# Patient Record
Sex: FEMALE | Race: WHITE | HISPANIC OR LATINO | Employment: UNEMPLOYED | URBAN - METROPOLITAN AREA
[De-identification: names, ages, dates, MRNs, and addresses within clinical notes are randomized per-mention and may not be internally consistent; named-entity substitution may affect disease eponyms.]

---

## 2021-06-24 ENCOUNTER — OFFICE VISIT (OUTPATIENT)
Dept: INTERNAL MEDICINE CLINIC | Facility: CLINIC | Age: 31
End: 2021-06-24

## 2021-06-24 VITALS
OXYGEN SATURATION: 100 % | DIASTOLIC BLOOD PRESSURE: 78 MMHG | WEIGHT: 152 LBS | TEMPERATURE: 97.9 F | HEART RATE: 91 BPM | SYSTOLIC BLOOD PRESSURE: 111 MMHG | HEIGHT: 65 IN | BODY MASS INDEX: 25.33 KG/M2

## 2021-06-24 DIAGNOSIS — Z86.39 HISTORY OF THYROID NODULE: ICD-10-CM

## 2021-06-24 DIAGNOSIS — R42 EPISODIC LIGHTHEADEDNESS: ICD-10-CM

## 2021-06-24 DIAGNOSIS — Z87.442 PERSONAL HISTORY OF KIDNEY STONES: ICD-10-CM

## 2021-06-24 DIAGNOSIS — Z13.220 SCREENING, LIPID: ICD-10-CM

## 2021-06-24 DIAGNOSIS — Z86.39 HISTORY OF HYPOTHYROIDISM: Primary | ICD-10-CM

## 2021-06-24 DIAGNOSIS — Z23 COVID-19 VACCINE SERIES STARTED: ICD-10-CM

## 2021-06-24 PROCEDURE — 99203 OFFICE O/P NEW LOW 30 MIN: CPT | Performed by: PHYSICIAN ASSISTANT

## 2021-06-24 NOTE — PROGRESS NOTES
Assessment/Plan: We are starting your care today  You report history of hypothyroidism as well as being told you had cysts and or nodules  Labs have been ordered and please schedule the thyroid ultrasound  You also report history of 1 episode of kidney stones in the past never had before no symptoms currently no additional evaluation needed at this time  You are aware you will need to schedule with gyn for routine women's health  You report you are on a list for a dentist with appointments possibly in August or September  Will get you scheduled for a follow-up once we have your lab results  Please sign medical records release to have previous medical records sent here for review  No problem-specific Assessment & Plan notes found for this encounter  Diagnoses and all orders for this visit:    History of hypothyroidism  -     T4, free  -     TSH, 3rd generation    History of thyroid nodule  -     US thyroid; Future    Personal history of kidney stones    Episodic lightheadedness  -     CBC and differential    Screening, lipid  -     Comprehensive metabolic panel  -     Lipid Panel with Direct LDL reflex    COVID-19 vaccine series started          Subjective:      Patient ID: Abigail Orozco is a 27 y o  female  Patient was scheduled for new patient to establish care  Patient does admit that she arrived to her appointment quite late as she is from [place] and got lost coming here  Patient does not have any insurance currently but has established with [facility]  Patient provides history of hypothyroidism and positive family history of same  Mother and sister, states sister is on medication  States was told in past abnormalities with cyst and nodules   Was on meds for 3 months for hypothyroidism then was told not needed    Thinks was about 1 year ago had evaluation      When asked patient does not have many symptoms of hypothyroidism    No constipation no weight gain not feeling more tired than usual does not feel cold or hot  Patient states she was concerned because she has noticed that her nails have been a bit more thin  Patient states she has had a few episodes of feeling lightheaded and thought might be her thyroid  Did ask questions patient's menstruation appears to be regular every month 4-5 days and she does not report extremely heavy bleeding or blood clots  Patient states that she received her 1st COVID vaccine in [place]  Patient states she also had her last Pap smear with her gyn in [place]  Patient also reports had one episode of kidney stones on the left side  States was treated for infection at same time  Never had previously and has not had any issues since then  Does not require any follow-up imaging at this time as patient had 1 episode and currently asymptomatic  Patient however will need to sign a records release to get her previous medical records sent here  Will continue her routine healthcare at follow-up visit as noted patient was late for this appointment due to location  The following portions of the patient's history were reviewed and updated as appropriate: allergies, current medications, past family history, past medical history, past social history, past surgical history and problem list     Review of Systems   Constitutional: Negative for chills, fever and unexpected weight change  Eyes: Negative  Respiratory: Negative for cough, chest tightness and shortness of breath  Gastrointestinal: Negative for abdominal pain, constipation, diarrhea, rectal pain and vomiting  Endocrine: Negative for cold intolerance, heat intolerance, polydipsia, polyphagia and polyuria  Genitourinary: Negative for menstrual problem (4-5 days states not really heavy)  Skin: Negative  Neurological: Positive for light-headedness  Negative for dizziness and headaches  Objective:      /78 (BP Location: Left arm, Patient Position: Sitting, Cuff Size: Standard)   Pulse 91   Temp 97 9 °F (36 6 °C) (Temporal)   Ht 5' 5" (1 651 m)   Wt 68 9 kg (152 lb)   SpO2 100%   BMI 25 29 kg/m²          Physical Exam  Vitals and nursing note reviewed  Constitutional:       General: She is not in acute distress  Appearance: She is not ill-appearing  HENT:      Head: Normocephalic  Mouth/Throat:      Mouth: Mucous membranes are moist       Pharynx: Oropharynx is clear  Comments: Multiple fillings  Eyes:      Extraocular Movements: Extraocular movements intact  Conjunctiva/sclera: Conjunctivae normal    Neck:      Comments: R thyroid larger than left, nontender  Cardiovascular:      Rate and Rhythm: Normal rate and regular rhythm  Heart sounds: Normal heart sounds  Pulmonary:      Effort: Pulmonary effort is normal       Breath sounds: Normal breath sounds  Abdominal:      General: Bowel sounds are normal       Tenderness: There is no abdominal tenderness  Musculoskeletal:      Cervical back: No tenderness  Skin:     General: Skin is warm and dry  Capillary Refill: Capillary refill takes less than 2 seconds  Neurological:      General: No focal deficit present  Mental Status: She is alert  Psychiatric:         Mood and Affect: Mood normal          Thought Content:  Thought content normal          Judgment: Judgment normal          PHQ-9 Depression Screening    PHQ-9:   Frequency of the following problems over the past two weeks:      Little interest or pleasure in doing things: 0 - not at all  Feeling down, depressed, or hopeless: 0 - not at all  PHQ-2 Score: 0

## 2021-06-28 ENCOUNTER — HOSPITAL ENCOUNTER (OUTPATIENT)
Dept: RADIOLOGY | Facility: HOSPITAL | Age: 31
Discharge: HOME | End: 2021-06-28
Payer: COMMERCIAL

## 2021-06-28 ENCOUNTER — APPOINTMENT (OUTPATIENT)
Dept: LAB | Facility: HOSPITAL | Age: 31
End: 2021-06-28
Payer: COMMERCIAL

## 2021-06-28 DIAGNOSIS — Z86.39 HISTORY OF THYROID NODULE: ICD-10-CM

## 2021-06-28 LAB
ALBUMIN SERPL BCP-MCNC: 3.6 G/DL (ref 3.5–5)
ALP SERPL-CCNC: 64 U/L (ref 46–116)
ALT SERPL W P-5'-P-CCNC: 16 U/L (ref 12–78)
ANION GAP SERPL CALCULATED.3IONS-SCNC: 5 MMOL/L (ref 4–13)
AST SERPL W P-5'-P-CCNC: 10 U/L (ref 5–45)
BASOPHILS # BLD AUTO: 0.07 THOUSANDS/ΜL (ref 0–0.1)
BASOPHILS NFR BLD AUTO: 1 % (ref 0–1)
BILIRUB SERPL-MCNC: 0.56 MG/DL (ref 0.2–1)
BUN SERPL-MCNC: 13 MG/DL (ref 5–25)
CALCIUM SERPL-MCNC: 9 MG/DL (ref 8.3–10.1)
CHLORIDE SERPL-SCNC: 106 MMOL/L (ref 100–108)
CHOLEST SERPL-MCNC: 171 MG/DL (ref 50–200)
CO2 SERPL-SCNC: 25 MMOL/L (ref 21–32)
CREAT SERPL-MCNC: 0.78 MG/DL (ref 0.6–1.3)
EOSINOPHIL # BLD AUTO: 0.17 THOUSAND/ΜL (ref 0–0.61)
EOSINOPHIL NFR BLD AUTO: 2 % (ref 0–6)
ERYTHROCYTE [DISTWIDTH] IN BLOOD BY AUTOMATED COUNT: 12.8 % (ref 11.6–15.1)
GFR SERPL CREATININE-BSD FRML MDRD: 101 ML/MIN/1.73SQ M
GLUCOSE P FAST SERPL-MCNC: 87 MG/DL (ref 65–99)
HCT VFR BLD AUTO: 39.5 % (ref 34.8–46.1)
HDLC SERPL-MCNC: 61 MG/DL
HGB BLD-MCNC: 12.9 G/DL (ref 11.5–15.4)
IMM GRANULOCYTES # BLD AUTO: 0.02 THOUSAND/UL (ref 0–0.2)
IMM GRANULOCYTES NFR BLD AUTO: 0 % (ref 0–2)
LDLC SERPL CALC-MCNC: 103 MG/DL (ref 0–100)
LYMPHOCYTES # BLD AUTO: 2.03 THOUSANDS/ΜL (ref 0.6–4.47)
LYMPHOCYTES NFR BLD AUTO: 27 % (ref 14–44)
MCH RBC QN AUTO: 30.5 PG (ref 26.8–34.3)
MCHC RBC AUTO-ENTMCNC: 32.7 G/DL (ref 31.4–37.4)
MCV RBC AUTO: 93 FL (ref 82–98)
MONOCYTES # BLD AUTO: 0.49 THOUSAND/ΜL (ref 0.17–1.22)
MONOCYTES NFR BLD AUTO: 7 % (ref 4–12)
NEUTROPHILS # BLD AUTO: 4.72 THOUSANDS/ΜL (ref 1.85–7.62)
NEUTS SEG NFR BLD AUTO: 63 % (ref 43–75)
NRBC BLD AUTO-RTO: 0 /100 WBCS
PLATELET # BLD AUTO: 171 THOUSANDS/UL (ref 149–390)
PMV BLD AUTO: 12.2 FL (ref 8.9–12.7)
POTASSIUM SERPL-SCNC: 4.1 MMOL/L (ref 3.5–5.3)
PROT SERPL-MCNC: 7.2 G/DL (ref 6.4–8.2)
RBC # BLD AUTO: 4.23 MILLION/UL (ref 3.81–5.12)
SODIUM SERPL-SCNC: 136 MMOL/L (ref 136–145)
T4 FREE SERPL-MCNC: 0.91 NG/DL (ref 0.76–1.46)
TRIGL SERPL-MCNC: 36 MG/DL
TSH SERPL DL<=0.05 MIU/L-ACNC: 1.69 UIU/ML (ref 0.36–3.74)
WBC # BLD AUTO: 7.5 THOUSAND/UL (ref 4.31–10.16)

## 2021-06-28 PROCEDURE — 84443 ASSAY THYROID STIM HORMONE: CPT | Performed by: PHYSICIAN ASSISTANT

## 2021-06-28 PROCEDURE — 36415 COLL VENOUS BLD VENIPUNCTURE: CPT | Performed by: PHYSICIAN ASSISTANT

## 2021-06-28 PROCEDURE — 84439 ASSAY OF FREE THYROXINE: CPT | Performed by: PHYSICIAN ASSISTANT

## 2021-06-28 PROCEDURE — 80053 COMPREHEN METABOLIC PANEL: CPT | Performed by: PHYSICIAN ASSISTANT

## 2021-06-28 PROCEDURE — 76536 US EXAM OF HEAD AND NECK: CPT

## 2021-06-28 PROCEDURE — 85025 COMPLETE CBC W/AUTO DIFF WBC: CPT | Performed by: PHYSICIAN ASSISTANT

## 2021-06-28 PROCEDURE — 80061 LIPID PANEL: CPT | Performed by: PHYSICIAN ASSISTANT

## 2021-07-14 ENCOUNTER — OFFICE VISIT (OUTPATIENT)
Dept: INTERNAL MEDICINE CLINIC | Facility: CLINIC | Age: 31
End: 2021-07-14

## 2021-07-14 ENCOUNTER — TELEPHONE (OUTPATIENT)
Dept: INTERNAL MEDICINE CLINIC | Facility: CLINIC | Age: 31
End: 2021-07-14

## 2021-07-14 VITALS
HEIGHT: 65 IN | HEART RATE: 74 BPM | SYSTOLIC BLOOD PRESSURE: 106 MMHG | DIASTOLIC BLOOD PRESSURE: 71 MMHG | BODY MASS INDEX: 25.74 KG/M2 | OXYGEN SATURATION: 100 % | TEMPERATURE: 98 F | WEIGHT: 154.5 LBS

## 2021-07-14 DIAGNOSIS — K21.9 GASTROESOPHAGEAL REFLUX DISEASE, UNSPECIFIED WHETHER ESOPHAGITIS PRESENT: Primary | ICD-10-CM

## 2021-07-14 DIAGNOSIS — Z86.19 HISTORY OF HELICOBACTER PYLORI INFECTION: ICD-10-CM

## 2021-07-14 PROCEDURE — 99213 OFFICE O/P EST LOW 20 MIN: CPT | Performed by: PHYSICIAN ASSISTANT

## 2021-07-14 NOTE — PROGRESS NOTES
Assessment/Plan: On your visit today we discussed that your stomach pain is not new in fact you now provide history that you have had recurrent acid reflux  You report you have had 3 EGDs most recently September of 2020 in [place]  You also report that you were told positive for H pylori  You are allergic to penicillin but you state you were not able to tolerate the alternative treatments for H pylori  We did review however that you will continue to get symptoms if the bacteria is not treated  Because you were told you had ulcers repetitive EGDs and H pylori referral to GI has been provided today to discuss your evaluation and additional treatment options  In the meantime you may continue to take the Nexium that you purchased over-the-counter  No problem-specific Assessment & Plan notes found for this encounter  Diagnoses and all orders for this visit:    Gastroesophageal reflux disease, unspecified whether esophagitis present  -     Ambulatory referral to Gastroenterology; Future    History of Helicobacter pylori infection  -     Ambulatory referral to Gastroenterology; Future          Subjective:      Patient ID: Jeferson Perez is a 27 y o  female  Patient presents today for same-day medical home with complaint of abdominal pain that started about 2 weeks ago  Has had the same in past states has had EGD X 3 last was Sept 2020 in [place]   patient reports that she was told she had an ulcer and H pylori bacteria  Patient states she was not treated with the antibiotic stating it is due to her penicillin allergy  I advised her that there are alternative treatments but she states the other medications made her stomach really bad so she did not take them  Reviewed with patient that untreated H pylori will means she will continue to have repetitive symptoms  Pain is epigastric and gets worse after eating     Has Nexium and taking in the morning and seems to help a bit  Some nausea  No vomting      Patient does not always have symptoms of acid reflux up into her esophagus that will depend on what she is eating  But it has affected her voice at times  This is also not new  Will have patient meet with Gastroenterology for further discussion as based on her personal history she has had multiple EGDs with ulcers untreated H pylori but resistant to alternative antibiotics other than penicillins which she is allergic to  The following portions of the patient's history were reviewed and updated as appropriate: allergies, current medications, past family history, past medical history, past social history, past surgical history and problem list     Review of Systems   Constitutional: Negative  Negative for chills and fever  Respiratory: Negative  Cardiovascular: Negative  Gastrointestinal: Positive for abdominal distention, abdominal pain and nausea  Negative for vomiting  Neurological: Negative  Psychiatric/Behavioral: Negative  Objective:      /71 (BP Location: Left arm, Patient Position: Sitting, Cuff Size: Standard)   Pulse 74   Temp 98 °F (36 7 °C) (Temporal)   Ht 5' 5" (1 651 m)   Wt 70 1 kg (154 lb 8 oz)   SpO2 100%   BMI 25 71 kg/m²          Physical Exam  Vitals and nursing note reviewed  Cardiovascular:      Rate and Rhythm: Normal rate and regular rhythm  Heart sounds: Normal heart sounds  Pulmonary:      Effort: Pulmonary effort is normal       Breath sounds: Normal breath sounds  Abdominal:      Tenderness: There is abdominal tenderness in the epigastric area and left upper quadrant  There is no guarding or rebound  Negative signs include Page's sign  Hernia: No hernia is present  Neurological:      Mental Status: Mental status is at baseline

## 2021-07-14 NOTE — PATIENT INSTRUCTIONS
On your visit today we discussed that your stomach pain is not new in fact you now provide history that you have had recurrent acid reflux  You report you have had 3 EGDs most recently September of 2020 in [place]  You also report that you were told positive for H pylori  You are allergic to penicillin but you state you were not able to tolerate the alternative treatments for H pylori  We did review however that you will continue to get symptoms if the bacteria is not treated  Because you were told you had ulcers repetitive EGDs and H pylori referral to GI has been provided today to discuss your evaluation and additional treatment options  In the meantime you may continue to take the Nexium that you purchased over-the-counter  Dieta para úlceras estomacales y gastritis   LO QUE NECESITA SABER:   ¿Qué es alexander dieta para úlceras estomacales y gastritis? Alexander dieta para úlceras y gastritis es un plan alimenticio que limita los alimentos que irritan de la rosa BJURHOLM  Ciertos alimentos [employer] síntomas shasta dolor de Rosita DILLON, acidez estomacal o indigestión  ¿Qué alimentos bereket limitar o evitar? Es posible que usted necesite evitar los alimentos ácidos, picantes o altos en grasa  No todos los alimentos afectan a las personas de la [address]  [employer]  Usted necesitará aprender cuáles alimentos empeoran marianne síntomas y tendrá que limitar esos alimentos  Los siguientes son algunos alimentos que podrían empeorar alexander úlcera o los síntomas de la gastritis:  · Bebidas:     ? Heidy Mulligan y [place]    ? Chocolate caliente y refrescos de cola    ? Cualquier bebida con cafeína    ? Café regular y descafeinado    ? Té de hierbabuena y menta kristal    ? Té kristal o mireya, regular o descafeinado    ? Berenda Rand y toronja    ? Bebidas alcohólicas    · Especias y condimentos:     ? Karyle Setter y Jose Gene    ? Polvo de chile    ?  Semilla de mostaza y Radha    · Otros alimentos:     ? Alimentos lácteos hechos de Edgar Taylor    ? Chocolate    ? Quesos picantes o de sabor dolores, shasta de jalapeño o jessica chuyita    ? Carne con alto contenido de grasa y [place] condimentada, shasta el [place], [place], [place], [facility] york y embutidos    ? Chiles picantes    ? Productos derivados del tomate, shasta pasta de [place], salsa o jugo del mismo    ¿Cuáles alimentos puedo consumir? Consuma alexander variedad de alimentos saludables de todos los grupos alimenticios  Consuma frutas, verduras, granos enteros y productos lácteos descremados o bajos en grasa  Los granos [employer], los cereales, la pasta y el arroz integral  Elija la carne [place], aves de machado (ramón y Joe), pescado, frijoles, huevos y erika secos  Un plan alimenticio saludable tiene un contenido bajo de grasas no saludables, sal y azúcar  Las grasas saludables incluyen el aceite de polanco y de canola  Solicite a walker dietista más información acerca de un plan alimenticio saludable  ¿Qué otras pautas podrían ser [place]? · No coma anthony antes de acostarse  Deje de comer por lo menos 2 horas antes de acostarse  · Coma comidas pequeñas y con frecuencia  Es probable que walker estómago tolere mejor comidas pequeñas y frecuentes en vez de comidas grandes  ACUERDOS SOBRE WALKER CUIDADO:   Usted tiene el derecho de ayudar a planear walker cuidado  Discuta marianne opciones de tratamiento con marianne médicos para decidir el cuidado que usted desea recibir  Usted siempre tiene el derecho de rechazar el tratamiento  Esta información es sólo para uso en educación  Walker intención no es darle un consejo médico sobre enfermedades o tratamientos  Colsulte con walker Ladean Artist farmacéutico antes de seguir cualquier régimen médico para saber si es seguro y efectivo para usted  © Copyright [address] [place] [address] Drive Information is for End User's use only and may not be sold, redistributed or otherwise used for commercial purposes   All illustrations and images included in CareNotes® are the copyrighted property of A D A M , Inc  or Emiliano Oates

## 2021-08-18 ENCOUNTER — CONSULT (OUTPATIENT)
Dept: GASTROENTEROLOGY | Facility: CLINIC | Age: 31
End: 2021-08-18

## 2021-08-18 VITALS
BODY MASS INDEX: 26.16 KG/M2 | HEART RATE: 75 BPM | TEMPERATURE: 99.2 F | WEIGHT: 157 LBS | HEIGHT: 65 IN | DIASTOLIC BLOOD PRESSURE: 71 MMHG | SYSTOLIC BLOOD PRESSURE: 109 MMHG

## 2021-08-18 DIAGNOSIS — K21.9 GASTROESOPHAGEAL REFLUX DISEASE, UNSPECIFIED WHETHER ESOPHAGITIS PRESENT: ICD-10-CM

## 2021-08-18 DIAGNOSIS — Z86.19 HISTORY OF HELICOBACTER PYLORI INFECTION: ICD-10-CM

## 2021-08-18 PROCEDURE — 99244 OFF/OP CNSLTJ NEW/EST MOD 40: CPT | Performed by: INTERNAL MEDICINE

## 2021-08-18 RX ORDER — METRONIDAZOLE 500 MG/1
500 TABLET ORAL EVERY 8 HOURS SCHEDULED
Qty: 42 TABLET | Refills: 0 | Status: SHIPPED | OUTPATIENT
Start: 2021-08-18 | End: 2021-09-01

## 2021-08-18 RX ORDER — TETRACYCLINE HYDROCHLORIDE 500 MG/1
500 CAPSULE ORAL 4 TIMES DAILY
Qty: 56 CAPSULE | Refills: 0 | Status: SHIPPED | OUTPATIENT
Start: 2021-08-18 | End: 2021-09-01

## 2021-08-18 RX ORDER — BISMUTH SUBSALICYLATE 262 MG/1
524 TABLET, CHEWABLE ORAL
Qty: 112 TABLET | Refills: 0 | Status: SHIPPED | OUTPATIENT
Start: 2021-08-18 | End: 2021-09-01

## 2021-08-18 NOTE — ASSESSMENT & PLAN NOTE
Symptoms currently well controlled on Nexium 20 mg 2 times daily  Symptoms likely exacerbated by untreated underlying H pylori infection  Has had multiple EGDs in her home country of [facility] with most recent EGD in 09/2020  Patient reports that she was told that she was positive for H pylori at that time    · Continue Nexium 2 times daily  · Treatment for H pylori infection as noted below  · Anti-reflux measures

## 2021-08-18 NOTE — ASSESSMENT & PLAN NOTE
Reports that she was told she had H pylori infection based on biopsies on upper endoscopy performed in her home country of [address] [place] [address]  Procedure report reviewed on patient's phone  Patient reports that treatment was attempted with Levaquin based therapy but she was not able to tolerate the Levaquin which caused significant abdominal pain and worsening nausea  She has an allergy to penicillin which causes significant itching and hives    · Discussed options regarding antibiotic regimen that would be most feasible for the patient given her allergy history, intolerance to Levaquin, and financial constraints   · Clarithromycin-based triple therapy could be considered but given the likely high antibiotic resistance in her home country of [address] [place] [address], I feel that this regimen would not be effective  · As such, will proceed with tetracycline-based quadruple therapy  · Confirmed with patient that she is able to tolerate tetracycline, metronidazole, and bismuth which she states that she is able to tolerate   · Will provide patient with paper prescriptions to be taken to the pharmacy for cost issues and also advised getting [employer] application on her phone for coupons  · Tetracycline 500 mg 4 times daily   · Metronidazole 500 mg 3 times daily   · Bismuth subsalicylate 047 mg 4 times daily   · Nexium 20 mg 2 times daily  · Medication should be taken for a total course of 14 days  · Advised patient to take medications as directed and not to miss any doses which would certainly increase risk of antibiotic resistance  · Also instructed patient to complete stool antigen test 4 weeks after completing antibiotic regimen and instructed her to be off of Nexium for at least 2 weeks prior to performing the stool test  · Patient verbalized her understanding of my instructions  · Follow-up as needed

## 2021-08-18 NOTE — PROGRESS NOTES
[facility] - Outpatient Consultation  Dre Sullivan 27 y o  female MRN: [id number]  Encounter: [id number]      ASSESSMENT AND PLAN:      Problem List Items Addressed This Visit        Digestive    GERD (gastroesophageal reflux disease)     Symptoms currently well controlled on Nexium 20 mg 2 times daily  Symptoms likely exacerbated by untreated underlying H pylori infection  Has had multiple EGDs in her home country of [place] [facility] with most recent EGD in 09/2020  Patient reports that she was told that she was positive for H pylori at that time  · Continue Nexium 2 times daily  · Treatment for H pylori infection as noted below  · Anti-reflux measures         Relevant Medications    esomeprazole (NexIUM) 20 mg capsule    tetracycline (ACHROMYCIN,SUMYCIN) 500 MG capsule    metroNIDAZOLE (FLAGYL) 500 mg tablet    bismuth subsalicylate (PEPTO BISMOL) 262 MG chewable tablet    Other Relevant Orders    H  pylori antigen, stool       Other    History of Helicobacter pylori infection     Reports that she was told she had H pylori infection based on biopsies on upper endoscopy performed in her home country of [place]  Procedure report reviewed on patient's phone  Patient reports that treatment was attempted with Levaquin based therapy but she was not able to tolerate the Levaquin which caused significant abdominal pain and worsening nausea  She has an allergy to penicillin which causes significant itching and hives    · Discussed options regarding antibiotic regimen that would be most feasible for the patient given her allergy history, intolerance to Levaquin, and financial constraints   · Clarithromycin-based triple therapy could be considered but given the likely high antibiotic resistance in her home country of [facility], I feel that this regimen would not be effective  · As such, will proceed with tetracycline-based quadruple therapy  · Confirmed with patient that she is able to tolerate tetracycline, metronidazole, and bismuth which she states that she is able to tolerate   · Will provide patient with paper prescriptions to be taken to the pharmacy for cost issues and also advised getting [employer] application on her phone for coupons  · Tetracycline 500 mg 4 times daily   · Metronidazole 500 mg 3 times daily   · Bismuth subsalicylate 617 mg 4 times daily   · Nexium 20 mg 2 times daily  · Medication should be taken for a total course of 14 days  · Advised patient to take medications as directed and not to miss any doses which would certainly increase risk of antibiotic resistance  · Also instructed patient to complete stool antigen test 4 weeks after completing antibiotic regimen and instructed her to be off of Nexium for at least 2 weeks prior to performing the stool test  · Patient verbalized her understanding of my instructions  · Follow-up as needed         Relevant Medications    tetracycline (ACHROMYCIN,SUMYCIN) 500 MG capsule    metroNIDAZOLE (FLAGYL) 500 mg tablet    bismuth subsalicylate (PEPTO BISMOL) 262 MG chewable tablet    Other Relevant Orders    H  pylori antigen, stool        ______________________________________________________________________    HPI:  76-year-old female presents to GI office for recommendations regarding treatment for untreated H pylori infection as well as chronic GERD  Patient reports that she has longstanding history of GERD and is currently on Nexium 20 mg 2 times daily which controls her symptoms well  However, she reports that she has untreated H pylori infection which was reportedly diagnosed following biopsies on upper endoscopy performed in her home country of [address] [place] [address]  This was last performed in 09/2020  Reports that she was attempted treatment with Levaquin-based triple therapy but she was not able to tolerate the Levaquin which caused severe abdominal pain and nausea    She has a penicillin allergy which causes hives and severe itching  She does report that if she eats certain acidic or spicy foods, her symptoms certainly do flare up but otherwise they are well controlled on Nexium 2 times daily  Denies any melena, hematochezia, unintentional weight loss, nausea, vomiting, dysphagia, odynophagia  Denies frequent NSAID use  REVIEW OF SYSTEMS:    CONSTITUTIONAL: Denies any fever, chills, rigors, and weight loss  HEENT: No earache or tinnitus, denies hearing loss or visual disturbances  CARDIOVASCULAR: No chest pain or palpitations  RESPIRATORY: Denies any cough, hemoptysis, shortness of breath or dyspnea on exertion  GASTROINTESTINAL: As noted in the History of Present Illness  GENITOURINARY: No problems with urination, denies any hematuria or dysuria  NEUROLOGIC: No dizziness or vertigo, denies headaches   MUSCULOSKELETAL: Denies any muscle or joint pain   SKIN: Denies skin rashes or itching  ENDOCRINE: Denies excessive thirst, denies intolerance to heat or cold  PSYCHOSOCIAL: Denies depression or anxiety, denies any recent memory loss     Historical Information   History reviewed  No pertinent past medical history  Past Surgical History:   Procedure Laterality Date    COLONOSCOPY      UPPER GASTROINTESTINAL ENDOSCOPY       Social History   Social History     Substance and Sexual Activity   Alcohol Use Never     Social History     Substance and Sexual Activity   Drug Use Never     Social History     Tobacco Use   Smoking Status Never Smoker   Smokeless Tobacco Never Used     Family History   Problem Relation Age of Onset    Cancer Mother         breast    Thyroid disease Mother     No Known Problems Father     Thyroid disease Sister     No Known Problems Brother     No Known Problems Son        Meds/Allergies   (Not in a hospital admission)    No current facility-administered medications for this visit       Allergies   Allergen Reactions    Penicillins Hives and Nausea Only         PHYSICAL EXAM:      Objective Blood pressure 109/71, pulse 75, temperature 99 2 °F (37 3 °C), temperature source Tympanic, height 5' 5" (1 651 m), weight 71 2 kg (157 lb)  Body mass index is 26 13 kg/m²  General Appearance:   Alert, cooperative, no distress   HEENT:   Normocephalic, atraumatic, anicteric     Neck:   Supple, symmetrical, trachea midline   Lungs:   Respirations unlabored    Heart:   Regular rate and rhythm   Abdomen:   Soft, non-tender, non-distended; normal bowel sounds; no masses, no organomegaly    Genitalia:   Deferred    Rectal:   Deferred    Extremities:   No cyanosis, clubbing or edema    Pulses:   2+ and symmetric all extremities    Skin:   No jaundice, rashes, or lesions    Lymph Nodes:   No palpable cervical lymphadenopathy        LAB RESULTS:     No visits with results within 1 Day(s) from this visit     Latest known visit with results is:   Office Visit on 06/24/2021   Component Date Value    Sodium 06/28/2021 136     Potassium 06/28/2021 4 1     Chloride 06/28/2021 106     CO2 06/28/2021 25     ANION GAP 06/28/2021 5     BUN 06/28/2021 13     Creatinine 06/28/2021 0 78     Glucose, Fasting 06/28/2021 87     Calcium 06/28/2021 9 0     AST 06/28/2021 10     ALT 06/28/2021 16     Alkaline Phosphatase 06/28/2021 64     Total Protein 06/28/2021 7 2     Albumin 06/28/2021 3 6     Total Bilirubin 06/28/2021 0 56     eGFR 06/28/2021 101     Cholesterol 06/28/2021 171     Triglycerides 06/28/2021 36     HDL, Direct 06/28/2021 61     LDL Calculated 06/28/2021 103*    WBC 06/28/2021 7 50     RBC 06/28/2021 4 23     Hemoglobin 06/28/2021 12 9     Hematocrit 06/28/2021 39 5     MCV 06/28/2021 93     MCH 06/28/2021 30 5     MCHC 06/28/2021 32 7     RDW 06/28/2021 12 8     MPV 06/28/2021 12 2     Platelets 70/45/7693 171     nRBC 06/28/2021 0     Neutrophils Relative 06/28/2021 63     Immat GRANS % 06/28/2021 0     Lymphocytes Relative 06/28/2021 27     Monocytes Relative 06/28/2021 7     Eosinophils Relative 06/28/2021 2     Basophils Relative 06/28/2021 1     Neutrophils Absolute 06/28/2021 4 72     Immature Grans Absolute 06/28/2021 0 02     Lymphocytes Absolute 06/28/2021 2 03     Monocytes Absolute 06/28/2021 0 49     Eosinophils Absolute 06/28/2021 0 17     Basophils Absolute 06/28/2021 0 07     Free T4 06/28/2021 0 91     TSH 3RD GENERATON 06/28/2021 1 690        RADIOLOGY RESULTS: I have personally reviewed pertinent imaging studies  DAVID Bautista  Gastroenterology Fellow  [facility]  Available on [email or web address]  [email or web address]

## 2021-08-18 NOTE — PATIENT INSTRUCTIONS
You will be given prescriptions for tetracycline, metronidazole, and bismuth (Peptobismol)  Take these medications along with esomeprazole for 2 weeks (14 days)  You will need to do a stool test 4 weeks after finishing the medicines to confirm that H pylori was treated    You will need to be off esomeprazole for at least 2 weeks before doing the stool test

## 2022-03-15 ENCOUNTER — TELEPHONE (OUTPATIENT)
Dept: INTERNAL MEDICINE CLINIC | Facility: CLINIC | Age: 32
End: 2022-03-15

## 2022-03-15 NOTE — TELEPHONE ENCOUNTER
LM explaining Dr Kelvin Ventura is no longer seeing patients and to call us back if she would like to be put on our recall list     Please update task upon speaking to patient

## 2022-03-21 NOTE — TELEPHONE ENCOUNTER
Patient agreed to have her appt cancelled and did not want to be added to our recall list  She stated she would call back when she has a chance